# Patient Record
Sex: FEMALE | Race: OTHER | HISPANIC OR LATINO | URBAN - METROPOLITAN AREA
[De-identification: names, ages, dates, MRNs, and addresses within clinical notes are randomized per-mention and may not be internally consistent; named-entity substitution may affect disease eponyms.]

---

## 2018-05-25 ENCOUNTER — OUTPATIENT (OUTPATIENT)
Dept: OUTPATIENT SERVICES | Facility: HOSPITAL | Age: 38
LOS: 1 days | End: 2018-05-25
Payer: COMMERCIAL

## 2018-05-25 VITALS
TEMPERATURE: 98 F | DIASTOLIC BLOOD PRESSURE: 83 MMHG | WEIGHT: 179.9 LBS | RESPIRATION RATE: 18 BRPM | OXYGEN SATURATION: 100 % | SYSTOLIC BLOOD PRESSURE: 125 MMHG | HEIGHT: 63 IN | HEART RATE: 62 BPM

## 2018-05-25 DIAGNOSIS — S92.214D: ICD-10-CM

## 2018-05-25 DIAGNOSIS — Z01.818 ENCOUNTER FOR OTHER PREPROCEDURAL EXAMINATION: ICD-10-CM

## 2018-05-25 LAB
ANION GAP SERPL CALC-SCNC: 6 MMOL/L — SIGNIFICANT CHANGE UP (ref 5–17)
APTT BLD: 30 SEC — SIGNIFICANT CHANGE UP (ref 27.5–37.4)
BUN SERPL-MCNC: 14 MG/DL — SIGNIFICANT CHANGE UP (ref 7–18)
CALCIUM SERPL-MCNC: 8.6 MG/DL — SIGNIFICANT CHANGE UP (ref 8.4–10.5)
CHLORIDE SERPL-SCNC: 106 MMOL/L — SIGNIFICANT CHANGE UP (ref 96–108)
CO2 SERPL-SCNC: 28 MMOL/L — SIGNIFICANT CHANGE UP (ref 22–31)
CREAT SERPL-MCNC: 0.81 MG/DL — SIGNIFICANT CHANGE UP (ref 0.5–1.3)
GLUCOSE SERPL-MCNC: 92 MG/DL — SIGNIFICANT CHANGE UP (ref 70–99)
HCG SERPL-ACNC: <1 MIU/ML — SIGNIFICANT CHANGE UP
HCT VFR BLD CALC: 42 % — SIGNIFICANT CHANGE UP (ref 34.5–45)
HGB BLD-MCNC: 13.9 G/DL — SIGNIFICANT CHANGE UP (ref 11.5–15.5)
INR BLD: 1 RATIO — SIGNIFICANT CHANGE UP (ref 0.88–1.16)
MCHC RBC-ENTMCNC: 29.7 PG — SIGNIFICANT CHANGE UP (ref 27–34)
MCHC RBC-ENTMCNC: 33.1 GM/DL — SIGNIFICANT CHANGE UP (ref 32–36)
MCV RBC AUTO: 89.7 FL — SIGNIFICANT CHANGE UP (ref 80–100)
PLATELET # BLD AUTO: 222 K/UL — SIGNIFICANT CHANGE UP (ref 150–400)
POTASSIUM SERPL-MCNC: 4.4 MMOL/L — SIGNIFICANT CHANGE UP (ref 3.5–5.3)
POTASSIUM SERPL-SCNC: 4.4 MMOL/L — SIGNIFICANT CHANGE UP (ref 3.5–5.3)
PROTHROM AB SERPL-ACNC: 10.9 SEC — SIGNIFICANT CHANGE UP (ref 9.8–12.7)
RBC # BLD: 4.68 M/UL — SIGNIFICANT CHANGE UP (ref 3.8–5.2)
RBC # FLD: 11.9 % — SIGNIFICANT CHANGE UP (ref 10.3–14.5)
SODIUM SERPL-SCNC: 140 MMOL/L — SIGNIFICANT CHANGE UP (ref 135–145)
WBC # BLD: 8.2 K/UL — SIGNIFICANT CHANGE UP (ref 3.8–10.5)
WBC # FLD AUTO: 8.2 K/UL — SIGNIFICANT CHANGE UP (ref 3.8–10.5)

## 2018-05-25 PROCEDURE — 85730 THROMBOPLASTIN TIME PARTIAL: CPT

## 2018-05-25 PROCEDURE — 84702 CHORIONIC GONADOTROPIN TEST: CPT

## 2018-05-25 PROCEDURE — 80048 BASIC METABOLIC PNL TOTAL CA: CPT

## 2018-05-25 PROCEDURE — G0463: CPT

## 2018-05-25 PROCEDURE — 85610 PROTHROMBIN TIME: CPT

## 2018-05-25 PROCEDURE — 85027 COMPLETE CBC AUTOMATED: CPT

## 2018-05-25 NOTE — H&P PST ADULT - RS GEN PE MLT RESP DETAILS PC
respirations non-labored/good air movement/breath sounds equal/no subcutaneous emphysema/airway patent/no rales/no intercostal retractions/no wheezes/normal/no rhonchi/clear to auscultation bilaterally/no chest wall tenderness

## 2018-05-25 NOTE — H&P PST ADULT - ASSESSMENT
37 yr old female presents with c/o left foot pain due to fracture sustained after falling down 37 yr old female with PMH of Asthma ,GERD, migraine headache bilateral ovarian cyst, seasonal allergies presents with left foot fracture. Pt for open treatment of left foot cuboid fracture on 6/4/2018. Pt is at low risk for procedure.

## 2018-05-25 NOTE — H&P PST ADULT - PMH
Asthma    Cuboid fracture  left foot  GERD (gastroesophageal reflux disease)    Migraine headache    Ovarian cyst, bilateral    Preeclampsia    Seasonal allergies

## 2018-05-25 NOTE — H&P PST ADULT - HISTORY OF PRESENT ILLNESS
37 yr old female presents with c/o left foot pain due to fracture sustained after falling down 37 yr old female with PMH of Asthma ,GERD, migraine headache bilateral ovarian cyst, seasonal allergies presents with c/o left foot pain due to fracture sustained after falling down. Pt for open treatment of left foot cuboid fracture on 6/4/2018. 37 yr old female with PMH of mild intermittent asthma ,GERD, IBS-diarrhea, migraine headache bilateral ovarian cyst, seasonal allergies presents with c/o left foot pain due to fracture sustained after falling down. Pt for open treatment of left foot cuboid fracture on 6/4/2018.

## 2018-05-25 NOTE — H&P PST ADULT - RESPIRATORY AND THORAX COMMENTS
seasonal allergies, asthma seasonal allergies, asthma-last attack 5-6 years ago seasonal allergies, mild intermittent asthma-last attack 5-6 years ago

## 2018-05-25 NOTE — H&P PST ADULT - NSANTHOSAYNRD_GEN_A_CORE
No. TIFF screening performed.  STOP BANG Legend: 0-2 = LOW Risk; 3-4 = INTERMEDIATE Risk; 5-8 = HIGH Risk

## 2018-05-25 NOTE — H&P PST ADULT - NEGATIVE CARDIOVASCULAR SYMPTOMS
no chest pain/no palpitations/no claudication/no peripheral edema/no orthopnea/no paroxysmal nocturnal dyspnea/no dyspnea on exertion

## 2018-05-25 NOTE — H&P PST ADULT - FAMILY HISTORY
Mother  Still living? Yes, Estimated age: Age Unknown  Family history of hypertension in mother, Age at diagnosis: Age Unknown  Family history of heart disease, Age at diagnosis: Age Unknown  Family history of hyperlipidemia, Age at diagnosis: Age Unknown  Family history of thyroid disease in mother, Age at diagnosis: Age Unknown

## 2018-05-25 NOTE — H&P PST ADULT - PSH
H/O  section    H/O excision of ganglion cyst  right wrist  H/O tubal ligation    History of carpal tunnel surgery of right wrist

## 2018-05-30 DIAGNOSIS — Z98.51 TUBAL LIGATION STATUS: Chronic | ICD-10-CM

## 2018-05-30 DIAGNOSIS — J45.909 UNSPECIFIED ASTHMA, UNCOMPLICATED: ICD-10-CM

## 2018-05-30 DIAGNOSIS — Z98.890 OTHER SPECIFIED POSTPROCEDURAL STATES: Chronic | ICD-10-CM

## 2018-05-30 DIAGNOSIS — Z98.891 HISTORY OF UTERINE SCAR FROM PREVIOUS SURGERY: Chronic | ICD-10-CM

## 2018-05-30 DIAGNOSIS — S92.213A DISPLACED FRACTURE OF CUBOID BONE OF UNSPECIFIED FOOT, INITIAL ENCOUNTER FOR CLOSED FRACTURE: ICD-10-CM

## 2018-05-30 RX ORDER — SODIUM CHLORIDE 9 MG/ML
3 INJECTION INTRAMUSCULAR; INTRAVENOUS; SUBCUTANEOUS EVERY 8 HOURS
Qty: 0 | Refills: 0 | Status: DISCONTINUED | OUTPATIENT
Start: 2018-06-04 | End: 2018-06-12

## 2018-06-03 ENCOUNTER — TRANSCRIPTION ENCOUNTER (OUTPATIENT)
Age: 38
End: 2018-06-03

## 2018-06-04 ENCOUNTER — OUTPATIENT (OUTPATIENT)
Dept: OUTPATIENT SERVICES | Facility: HOSPITAL | Age: 38
LOS: 1 days | End: 2018-06-04
Payer: COMMERCIAL

## 2018-06-04 VITALS
RESPIRATION RATE: 16 BRPM | DIASTOLIC BLOOD PRESSURE: 83 MMHG | HEART RATE: 66 BPM | TEMPERATURE: 98 F | SYSTOLIC BLOOD PRESSURE: 119 MMHG | OXYGEN SATURATION: 100 %

## 2018-06-04 VITALS
SYSTOLIC BLOOD PRESSURE: 129 MMHG | WEIGHT: 179.9 LBS | HEIGHT: 63 IN | RESPIRATION RATE: 17 BRPM | HEART RATE: 79 BPM | DIASTOLIC BLOOD PRESSURE: 76 MMHG | OXYGEN SATURATION: 100 % | TEMPERATURE: 98 F

## 2018-06-04 DIAGNOSIS — S92.214A NONDISPLACED FRACTURE OF CUBOID BONE OF RIGHT FOOT, INITIAL ENCOUNTER FOR CLOSED FRACTURE: ICD-10-CM

## 2018-06-04 DIAGNOSIS — Z98.890 OTHER SPECIFIED POSTPROCEDURAL STATES: Chronic | ICD-10-CM

## 2018-06-04 DIAGNOSIS — Z98.51 TUBAL LIGATION STATUS: Chronic | ICD-10-CM

## 2018-06-04 DIAGNOSIS — Z98.891 HISTORY OF UTERINE SCAR FROM PREVIOUS SURGERY: Chronic | ICD-10-CM

## 2018-06-04 LAB — HCG UR QL: NEGATIVE — SIGNIFICANT CHANGE UP

## 2018-06-04 RX ORDER — ERGOCALCIFEROL 1.25 MG/1
1 CAPSULE ORAL
Qty: 0 | Refills: 0 | COMMUNITY

## 2018-06-04 RX ORDER — ALBUTEROL 90 UG/1
2 AEROSOL, METERED ORAL
Qty: 0 | Refills: 0 | COMMUNITY

## 2018-06-04 RX ORDER — IBUPROFEN 200 MG
1 TABLET ORAL
Qty: 0 | Refills: 0 | COMMUNITY

## 2018-06-04 RX ORDER — ONDANSETRON 8 MG/1
4 TABLET, FILM COATED ORAL ONCE
Qty: 0 | Refills: 0 | Status: COMPLETED | OUTPATIENT
Start: 2018-06-04 | End: 2018-06-04

## 2018-06-04 RX ORDER — SODIUM CHLORIDE 9 MG/ML
1000 INJECTION, SOLUTION INTRAVENOUS
Qty: 0 | Refills: 0 | Status: DISCONTINUED | OUTPATIENT
Start: 2018-06-04 | End: 2018-06-04

## 2018-06-04 RX ORDER — OXYCODONE HYDROCHLORIDE 5 MG/1
5 TABLET ORAL ONCE
Qty: 0 | Refills: 0 | Status: DISCONTINUED | OUTPATIENT
Start: 2018-06-04 | End: 2018-06-04

## 2018-06-04 RX ORDER — HYDROMORPHONE HYDROCHLORIDE 2 MG/ML
0.5 INJECTION INTRAMUSCULAR; INTRAVENOUS; SUBCUTANEOUS
Qty: 0 | Refills: 0 | Status: DISCONTINUED | OUTPATIENT
Start: 2018-06-04 | End: 2018-06-04

## 2018-06-04 RX ADMIN — ONDANSETRON 4 MILLIGRAM(S): 8 TABLET, FILM COATED ORAL at 14:39

## 2018-06-04 RX ADMIN — OXYCODONE HYDROCHLORIDE 5 MILLIGRAM(S): 5 TABLET ORAL at 15:26

## 2018-06-04 RX ADMIN — SODIUM CHLORIDE 3 MILLILITER(S): 9 INJECTION INTRAMUSCULAR; INTRAVENOUS; SUBCUTANEOUS at 14:40

## 2018-06-04 RX ADMIN — HYDROMORPHONE HYDROCHLORIDE 0.5 MILLIGRAM(S): 2 INJECTION INTRAMUSCULAR; INTRAVENOUS; SUBCUTANEOUS at 14:19

## 2018-06-04 RX ADMIN — HYDROMORPHONE HYDROCHLORIDE 0.5 MILLIGRAM(S): 2 INJECTION INTRAMUSCULAR; INTRAVENOUS; SUBCUTANEOUS at 14:29

## 2018-06-04 RX ADMIN — OXYCODONE HYDROCHLORIDE 5 MILLIGRAM(S): 5 TABLET ORAL at 15:56

## 2018-06-04 NOTE — ASU DISCHARGE PLAN (ADULT/PEDIATRIC). - NOTIFY
Swelling that continues/Bleeding that does not stop/Fever greater than 101/Numbness, color, or temperature change to extremity

## 2018-06-04 NOTE — BRIEF OPERATIVE NOTE - PROCEDURE
<<-----Click on this checkbox to enter Procedure ORIF fracture of tarsal bone  06/04/2018    Active  EDANNE

## 2018-06-04 NOTE — ASU DISCHARGE PLAN (ADULT/PEDIATRIC). - MEDICATION SUMMARY - MEDICATIONS TO TAKE
I will START or STAY ON the medications listed below when I get home from the hospital:    Motrin 400 mg oral tablet  -- 1 tab(s) by mouth every 6 hours, As Needed  -- Indication: For Pain    albuterol 90 mcg/inh inhalation aerosol  -- 2 puff(s) inhaled 4 times a day, As Needed  -- Indication: For Asthma    Vitamin D2 50,000 intl units (1.25 mg) oral capsule  -- 1 cap(s) by mouth once a week  -- Indication: For Prophylactic measure

## 2018-06-05 ENCOUNTER — RESULT REVIEW (OUTPATIENT)
Age: 38
End: 2018-06-05

## 2018-06-05 PROCEDURE — 28465 OPTX TARSAL BONE FX EACH: CPT | Mod: LT

## 2018-06-05 PROCEDURE — 81025 URINE PREGNANCY TEST: CPT

## 2018-06-05 PROCEDURE — C1713: CPT

## 2018-06-05 PROCEDURE — 88302 TISSUE EXAM BY PATHOLOGIST: CPT | Mod: 26

## 2018-06-05 PROCEDURE — C1889: CPT

## 2018-06-05 PROCEDURE — 88302 TISSUE EXAM BY PATHOLOGIST: CPT

## 2018-06-07 LAB — SURGICAL PATHOLOGY FINAL REPORT - CH: SIGNIFICANT CHANGE UP

## 2019-08-03 ENCOUNTER — EMERGENCY (EMERGENCY)
Facility: HOSPITAL | Age: 39
LOS: 0 days | Discharge: HOME | End: 2019-08-04
Attending: EMERGENCY MEDICINE
Payer: COMMERCIAL

## 2019-08-03 VITALS
WEIGHT: 175.05 LBS | SYSTOLIC BLOOD PRESSURE: 133 MMHG | DIASTOLIC BLOOD PRESSURE: 84 MMHG | RESPIRATION RATE: 18 BRPM | TEMPERATURE: 98 F | OXYGEN SATURATION: 99 % | HEIGHT: 63 IN | HEART RATE: 78 BPM

## 2019-08-03 VITALS
TEMPERATURE: 98 F | RESPIRATION RATE: 18 BRPM | OXYGEN SATURATION: 98 % | SYSTOLIC BLOOD PRESSURE: 126 MMHG | DIASTOLIC BLOOD PRESSURE: 72 MMHG | HEART RATE: 72 BPM

## 2019-08-03 DIAGNOSIS — Z88.0 ALLERGY STATUS TO PENICILLIN: ICD-10-CM

## 2019-08-03 DIAGNOSIS — Z98.891 HISTORY OF UTERINE SCAR FROM PREVIOUS SURGERY: Chronic | ICD-10-CM

## 2019-08-03 DIAGNOSIS — J45.909 UNSPECIFIED ASTHMA, UNCOMPLICATED: ICD-10-CM

## 2019-08-03 DIAGNOSIS — Z98.890 OTHER SPECIFIED POSTPROCEDURAL STATES: Chronic | ICD-10-CM

## 2019-08-03 DIAGNOSIS — R10.32 LEFT LOWER QUADRANT PAIN: ICD-10-CM

## 2019-08-03 DIAGNOSIS — D25.9 LEIOMYOMA OF UTERUS, UNSPECIFIED: ICD-10-CM

## 2019-08-03 DIAGNOSIS — N73.9 FEMALE PELVIC INFLAMMATORY DISEASE, UNSPECIFIED: ICD-10-CM

## 2019-08-03 DIAGNOSIS — R10.2 PELVIC AND PERINEAL PAIN: ICD-10-CM

## 2019-08-03 DIAGNOSIS — Z98.51 TUBAL LIGATION STATUS: Chronic | ICD-10-CM

## 2019-08-03 LAB
ALBUMIN SERPL ELPH-MCNC: 3.7 G/DL — SIGNIFICANT CHANGE UP (ref 3.3–5)
ALP SERPL-CCNC: 58 U/L — SIGNIFICANT CHANGE UP (ref 40–120)
ALT FLD-CCNC: 21 U/L — SIGNIFICANT CHANGE UP (ref 12–78)
ANION GAP SERPL CALC-SCNC: 7 MMOL/L — SIGNIFICANT CHANGE UP (ref 5–17)
APPEARANCE UR: CLEAR — SIGNIFICANT CHANGE UP
APTT BLD: 29 SEC — SIGNIFICANT CHANGE UP (ref 27.5–36.3)
AST SERPL-CCNC: 18 U/L — SIGNIFICANT CHANGE UP (ref 15–37)
BASOPHILS # BLD AUTO: 0.06 K/UL — SIGNIFICANT CHANGE UP (ref 0–0.2)
BASOPHILS NFR BLD AUTO: 0.6 % — SIGNIFICANT CHANGE UP (ref 0–2)
BILIRUB SERPL-MCNC: 0.2 MG/DL — SIGNIFICANT CHANGE UP (ref 0.2–1.2)
BILIRUB UR-MCNC: NEGATIVE — SIGNIFICANT CHANGE UP
BLD GP AB SCN SERPL QL: SIGNIFICANT CHANGE UP
BUN SERPL-MCNC: 16 MG/DL — SIGNIFICANT CHANGE UP (ref 7–23)
CALCIUM SERPL-MCNC: 8.9 MG/DL — SIGNIFICANT CHANGE UP (ref 8.5–10.1)
CHLORIDE SERPL-SCNC: 110 MMOL/L — HIGH (ref 96–108)
CO2 SERPL-SCNC: 25 MMOL/L — SIGNIFICANT CHANGE UP (ref 22–31)
COLOR SPEC: YELLOW — SIGNIFICANT CHANGE UP
CREAT SERPL-MCNC: 0.83 MG/DL — SIGNIFICANT CHANGE UP (ref 0.5–1.3)
DIFF PNL FLD: NEGATIVE — SIGNIFICANT CHANGE UP
EOSINOPHIL # BLD AUTO: 0.44 K/UL — SIGNIFICANT CHANGE UP (ref 0–0.5)
EOSINOPHIL NFR BLD AUTO: 4.1 % — SIGNIFICANT CHANGE UP (ref 0–6)
GLUCOSE SERPL-MCNC: 89 MG/DL — SIGNIFICANT CHANGE UP (ref 70–99)
GLUCOSE UR QL: NEGATIVE MG/DL — SIGNIFICANT CHANGE UP
HCT VFR BLD CALC: 39.8 % — SIGNIFICANT CHANGE UP (ref 34.5–45)
HGB BLD-MCNC: 12.9 G/DL — SIGNIFICANT CHANGE UP (ref 11.5–15.5)
IMM GRANULOCYTES NFR BLD AUTO: 0.4 % — SIGNIFICANT CHANGE UP (ref 0–1.5)
INR BLD: 0.91 RATIO — SIGNIFICANT CHANGE UP (ref 0.88–1.16)
KETONES UR-MCNC: NEGATIVE — SIGNIFICANT CHANGE UP
LACTATE SERPL-SCNC: 1.3 MMOL/L — SIGNIFICANT CHANGE UP (ref 0.7–2)
LEUKOCYTE ESTERASE UR-ACNC: NEGATIVE — SIGNIFICANT CHANGE UP
LIDOCAIN IGE QN: 152 U/L — SIGNIFICANT CHANGE UP (ref 73–393)
LYMPHOCYTES # BLD AUTO: 4.46 K/UL — HIGH (ref 1–3.3)
LYMPHOCYTES # BLD AUTO: 41.1 % — SIGNIFICANT CHANGE UP (ref 13–44)
MCHC RBC-ENTMCNC: 28.5 PG — SIGNIFICANT CHANGE UP (ref 27–34)
MCHC RBC-ENTMCNC: 32.4 GM/DL — SIGNIFICANT CHANGE UP (ref 32–36)
MCV RBC AUTO: 88.1 FL — SIGNIFICANT CHANGE UP (ref 80–100)
MONOCYTES # BLD AUTO: 0.77 K/UL — SIGNIFICANT CHANGE UP (ref 0–0.9)
MONOCYTES NFR BLD AUTO: 7.1 % — SIGNIFICANT CHANGE UP (ref 2–14)
NEUTROPHILS # BLD AUTO: 5.07 K/UL — SIGNIFICANT CHANGE UP (ref 1.8–7.4)
NEUTROPHILS NFR BLD AUTO: 46.7 % — SIGNIFICANT CHANGE UP (ref 43–77)
NITRITE UR-MCNC: NEGATIVE — SIGNIFICANT CHANGE UP
NRBC # BLD: 0 /100 WBCS — SIGNIFICANT CHANGE UP (ref 0–0)
PH UR: 5 — SIGNIFICANT CHANGE UP (ref 5–8)
PLATELET # BLD AUTO: 220 K/UL — SIGNIFICANT CHANGE UP (ref 150–400)
POTASSIUM SERPL-MCNC: 3.9 MMOL/L — SIGNIFICANT CHANGE UP (ref 3.5–5.3)
POTASSIUM SERPL-SCNC: 3.9 MMOL/L — SIGNIFICANT CHANGE UP (ref 3.5–5.3)
PROT SERPL-MCNC: 7.3 GM/DL — SIGNIFICANT CHANGE UP (ref 6–8.3)
PROT UR-MCNC: NEGATIVE MG/DL — SIGNIFICANT CHANGE UP
PROTHROM AB SERPL-ACNC: 10.2 SEC — SIGNIFICANT CHANGE UP (ref 10–12.9)
RBC # BLD: 4.52 M/UL — SIGNIFICANT CHANGE UP (ref 3.8–5.2)
RBC # FLD: 13.1 % — SIGNIFICANT CHANGE UP (ref 10.3–14.5)
SODIUM SERPL-SCNC: 142 MMOL/L — SIGNIFICANT CHANGE UP (ref 135–145)
SP GR SPEC: 1.01 — SIGNIFICANT CHANGE UP (ref 1.01–1.02)
UROBILINOGEN FLD QL: NEGATIVE MG/DL — SIGNIFICANT CHANGE UP
WBC # BLD: 10.84 K/UL — HIGH (ref 3.8–10.5)
WBC # FLD AUTO: 10.84 K/UL — HIGH (ref 3.8–10.5)

## 2019-08-03 PROCEDURE — 74177 CT ABD & PELVIS W/CONTRAST: CPT | Mod: 26

## 2019-08-03 PROCEDURE — 76856 US EXAM PELVIC COMPLETE: CPT | Mod: 26

## 2019-08-03 PROCEDURE — 99285 EMERGENCY DEPT VISIT HI MDM: CPT

## 2019-08-03 RX ORDER — MORPHINE SULFATE 50 MG/1
4 CAPSULE, EXTENDED RELEASE ORAL ONCE
Refills: 0 | Status: DISCONTINUED | OUTPATIENT
Start: 2019-08-03 | End: 2019-08-03

## 2019-08-03 RX ORDER — ONDANSETRON 8 MG/1
4 TABLET, FILM COATED ORAL ONCE
Refills: 0 | Status: COMPLETED | OUTPATIENT
Start: 2019-08-03 | End: 2019-08-03

## 2019-08-03 RX ORDER — SODIUM CHLORIDE 9 MG/ML
1000 INJECTION INTRAMUSCULAR; INTRAVENOUS; SUBCUTANEOUS ONCE
Refills: 0 | Status: COMPLETED | OUTPATIENT
Start: 2019-08-03 | End: 2019-08-03

## 2019-08-03 RX ORDER — AZITHROMYCIN 500 MG/1
1000 TABLET, FILM COATED ORAL ONCE
Refills: 0 | Status: COMPLETED | OUTPATIENT
Start: 2019-08-03 | End: 2019-08-03

## 2019-08-03 RX ADMIN — ONDANSETRON 4 MILLIGRAM(S): 8 TABLET, FILM COATED ORAL at 20:44

## 2019-08-03 RX ADMIN — MORPHINE SULFATE 4 MILLIGRAM(S): 50 CAPSULE, EXTENDED RELEASE ORAL at 20:44

## 2019-08-03 RX ADMIN — SODIUM CHLORIDE 1000 MILLILITER(S): 9 INJECTION INTRAMUSCULAR; INTRAVENOUS; SUBCUTANEOUS at 20:44

## 2019-08-03 NOTE — ED PROVIDER NOTE - OBJECTIVE STATEMENT
Pertinent PMH/PSH/FHx/SHx and Review of Systems contained within:    39yo F w PMH of asthma, uterine fibroids s/p hysterectomy 4mos ago, presents to ED for eval of LLQ pain & vaginal discharge.  Pt states she has noted mild LLQ pain for about 1wk, worse today.  Pt was in [facility] previously, had diarrhea, which resolved.  +nausea.  Denies fever, chills, CP, SOB, dysuria, hematuria. Pertinent PMH/PSH/FHx/SHx and Review of Systems contained within:    37yo F w PMH of asthma, uterine fibroids s/p hysterectomy 4mos ago, presents to ED for eval of LLQ pain & vaginal discharge.  Pt states she has noted mild LLQ pain for about 1wk, worse today.  Pt was in [facility] previously, had diarrhea, which resolved.  +nausea.  Denies fever, chills, CP, SOB, dysuria, hematuria.  Pt states she is sexually active, has low suspicion for STI but has been having vaginal bleeding w discharge.  +No abdominal pain, No neck/back pain, no rash, no changes in neurological status/function. Pertinent PMH/PSH/FHx/SHx and Review of Systems contained within:    39yo F w PMH of asthma, uterine fibroids s/p hysterectomy 4mos ago, presents to ED for eval of LLQ pain & vaginal discharge.  Pt states she has noted mild LLQ pain for about 1wk, worse today.  Pt was in [facility] previously, had diarrhea, which resolved.  +nausea.  Denies fever, chills, CP, SOB, dysuria, hematuria.  Pt states she is sexually active, has low suspicion for STI but has been having vaginal bleeding w discharge.    No fever/chills, No photophobia/eye pain/changes in vision, No ear pain/sore throat/dysphagia, No chest pain/palpitations, no SOB/cough/wheeze/stridor, +abdominal pain, No neck/back pain, no rash, no changes in neurological status/function.

## 2019-08-03 NOTE — ED ADULT TRIAGE NOTE - CHIEF COMPLAINT QUOTE
c/o l sided pelvic pain x 1 week s/p partial hysterectomy 4 months ago vaginal bleeding noted today" spotting" and vaginal d/c

## 2019-08-03 NOTE — ED ADULT NURSE NOTE - OBJECTIVE STATEMENT
pt. presents to the ED c/o left sided pelvic pain x 1 week s/p partial hysterectomy 4 months ago vaginal bleeding noted today" spotting" and vaginal d/c. pt sttaes " have a hard time urinating  and bladder hurts after that started today." pt states "feel nauseous and have a HA at this moment." pt denies vomiting,

## 2019-08-03 NOTE — ED PROVIDER NOTE - PHYSICAL EXAMINATION
Gen: Alert, c/o pain  Head: NC, AT, EOMI, normal lids/conjunctiva  ENT: normal hearing, patent oropharynx, MMM  Neck: supple, no tenderness/meningismus, FROM, Trachea midline  Pulm: Bilateral clear BS, normal resp effort, no wheeze/stridor/retractions  CV: RRR, no M/R/G, +dist pulses  Abd: soft, LUQ & LLQ TTP, ND, +BS, no guarding/rebound tenderness, no CVAT  Gyn: no blood in vaginal vault, no purulent vaginal discharge, +mucous at cervix, +CMT  Mskel: no edema/erythema/cyanosis  Skin: no rash  Neuro: no sensory/motor deficits

## 2019-08-03 NOTE — ED PROVIDER NOTE - CLINICAL SUMMARY MEDICAL DECISION MAKING FREE TEXT BOX
Pt w above dx, labs & imaging neg for acute pathology.  +bilateral ovarian cysts, no torsion.  Pt given Rx and instructed/cautioned on use. Discussed results and outcome of testing with the patient, given copy as well.  Patient advised to please follow up with their primary care doctor within the next 24 hours and return to the Emergency Department for worsening symptoms or any other concerns.  Patient advised that their doctor may call  to follow up on the specific results of the tests performed today in the emergency department.

## 2019-08-03 NOTE — ED ADULT NURSE NOTE - ED STAT RN HANDOFF DETAILS
Assuming patient's care for coverage. Report received from JUAN Nagy and patient informed during rounding. Assessment available on [facility]. Will continue to monitor

## 2019-08-04 PROBLEM — G43.909 MIGRAINE, UNSPECIFIED, NOT INTRACTABLE, WITHOUT STATUS MIGRAINOSUS: Chronic | Status: ACTIVE | Noted: 2018-05-30

## 2019-08-04 PROBLEM — J45.909 UNSPECIFIED ASTHMA, UNCOMPLICATED: Chronic | Status: ACTIVE | Noted: 2018-05-30

## 2019-08-04 PROBLEM — S92.213A: Chronic | Status: ACTIVE | Noted: 2018-05-30

## 2019-08-04 PROBLEM — K21.9 GASTRO-ESOPHAGEAL REFLUX DISEASE WITHOUT ESOPHAGITIS: Chronic | Status: ACTIVE | Noted: 2018-05-30

## 2019-08-04 PROBLEM — N83.201 UNSPECIFIED OVARIAN CYST, RIGHT SIDE: Chronic | Status: ACTIVE | Noted: 2018-05-30

## 2019-08-04 PROBLEM — J30.2 OTHER SEASONAL ALLERGIC RHINITIS: Chronic | Status: ACTIVE | Noted: 2018-05-30

## 2019-08-04 PROBLEM — O14.90 UNSPECIFIED PRE-ECLAMPSIA, UNSPECIFIED TRIMESTER: Chronic | Status: ACTIVE | Noted: 2018-05-30

## 2019-08-04 LAB
CULTURE RESULTS: SIGNIFICANT CHANGE UP
SPECIMEN SOURCE: SIGNIFICANT CHANGE UP

## 2019-08-04 RX ADMIN — Medication 100 MILLIGRAM(S): at 00:25

## 2019-08-04 RX ADMIN — AZITHROMYCIN 1000 MILLIGRAM(S): 500 TABLET, FILM COATED ORAL at 00:25

## 2019-08-05 LAB
C TRACH RRNA SPEC QL NAA+PROBE: SIGNIFICANT CHANGE UP
C TRACH RRNA SPEC QL NAA+PROBE: SIGNIFICANT CHANGE UP
N GONORRHOEA RRNA SPEC QL NAA+PROBE: SIGNIFICANT CHANGE UP
N GONORRHOEA RRNA SPEC QL NAA+PROBE: SIGNIFICANT CHANGE UP
SPECIMEN SOURCE: SIGNIFICANT CHANGE UP
SPECIMEN SOURCE: SIGNIFICANT CHANGE UP

## 2019-10-28 NOTE — ASU PREOP CHECKLIST - TEMPERATURE IN CELSIUS (DEGREES C)
36.9 Ear Star Wedge Flap Text: The defect edges were debeveled with a #15 blade scalpel.  Given the location of the defect and the proximity to free margins (helical rim) an ear star wedge flap was deemed most appropriate.  Using a sterile surgical marker, the appropriate flap was drawn incorporating the defect and placing the expected incisions between the helical rim and antihelix where possible.  The area thus outlined was incised through and through with a #15 scalpel blade.

## 2020-06-12 ENCOUNTER — TRANSCRIPTION ENCOUNTER (OUTPATIENT)
Age: 40
End: 2020-06-12

## 2021-06-08 NOTE — H&P PST ADULT - NEGATIVE GASTROINTESTINAL SYMPTOMS
no diarrhea/no constipation/no change in bowel habits/no abdominal pain/no flatulence/no vomiting/no nausea
Normal

## 2021-11-11 ENCOUNTER — APPOINTMENT (OUTPATIENT)
Dept: SURGERY | Facility: CLINIC | Age: 41
End: 2021-11-11
Payer: COMMERCIAL

## 2021-11-11 VITALS
HEART RATE: 74 BPM | DIASTOLIC BLOOD PRESSURE: 84 MMHG | BODY MASS INDEX: 34.04 KG/M2 | HEIGHT: 62 IN | WEIGHT: 185 LBS | TEMPERATURE: 97.6 F | OXYGEN SATURATION: 99 % | SYSTOLIC BLOOD PRESSURE: 137 MMHG

## 2021-11-11 DIAGNOSIS — Z78.9 OTHER SPECIFIED HEALTH STATUS: ICD-10-CM

## 2021-11-11 PROCEDURE — 99203 OFFICE O/P NEW LOW 30 MIN: CPT

## 2021-11-11 RX ORDER — CHOLECALCIFEROL (VITAMIN D3) 25 MCG
TABLET ORAL
Refills: 0 | Status: ACTIVE | COMMUNITY

## 2021-11-11 NOTE — HISTORY OF PRESENT ILLNESS
[de-identified] : Ms. HAWK MARCUS is a 41 year  old patient who was referred by Dr. Oscar Lobato with the chief complaint of having right upper quadrant and epigastric pain for 1 month. Pain is   radiating to the back.  She reports frequent episodes of   nausea and  vomiting. She reports  history of jaundice. she states that she was told that she had Hep A.     Appetite is good and weight is stable.   She   has no family history of biliary tract disease.   She had an abdominal sonogram on  10/29/2021 which did not show evidence of GB stones. CBD is normal.   She reports  , partial hysterectomy and abdominoplasty 5 months ago.  Patient was prescribed Augmentin by PMD. She is allergic to Penicillin. she was advised not to take it for now.

## 2021-11-11 NOTE — PHYSICAL EXAM
[Abdominal Masses] : No abdominal masses [Abdomen Tenderness] : ~T ~M Abdominal tenderness [Alert] : alert [Oriented to Person] : oriented to person [Oriented to Place] : oriented to place [Oriented to Time] : oriented to time [Calm] : calm [de-identified] : She  is alert, well-groomed, and in NAD\par   [de-identified] : anicteric.  Nasal mucosa pink, septum midline. Oral mucosa pink.  Tongue midline, Pharynx without exudates.\par   [de-identified] : Neck supple. Trachea midline. Thyroid isthmus barely palpable, lobes not felt.\par   [de-identified] : abdominoplasty scars

## 2021-11-11 NOTE — VITALS
[de-identified] : 8/10 [FreeTextEntry3] : right upper quadrant  [FreeTextEntry1] : nothing [FreeTextEntry2] : constant

## 2021-11-11 NOTE — PLAN
[FreeTextEntry1] : Ms. MARCUS  was told significance of findings, options, risks and benefits were explained.  .  All surgical options were discussed including non-surgical treatments.  She is with recent diagnosis of Hep A that could cause sensitivity and pain in her abdomen.   Her abdominal US is negative for GB stones. she was advised to have HIDA scan with EF and return after the test. \par Patient advised to seek immediate medical attention with any acute change in symptoms or with the development of any new or worsening symptoms.  Patient's questions and concerns addressed to patient's satisfaction, and patient verbalized an understanding of the information discussed.\par \par

## 2021-11-11 NOTE — CONSULT LETTER
[Dear  ___] : Dear  [unfilled], [Consult Letter:] : I had the pleasure of evaluating your patient, [unfilled]. [Please see my note below.] : Please see my note below. [Consult Closing:] : Thank you very much for allowing me to participate in the care of this patient.  If you have any questions, please do not hesitate to contact me. [Sincerely,] : Sincerely, [FreeTextEntry3] : Zeke Delgadillo MD, FACS

## 2021-11-12 ENCOUNTER — EMERGENCY (EMERGENCY)
Facility: HOSPITAL | Age: 41
LOS: 1 days | Discharge: HOME | End: 2021-11-12
Attending: STUDENT IN AN ORGANIZED HEALTH CARE EDUCATION/TRAINING PROGRAM | Admitting: STUDENT IN AN ORGANIZED HEALTH CARE EDUCATION/TRAINING PROGRAM
Payer: COMMERCIAL

## 2021-11-12 VITALS
DIASTOLIC BLOOD PRESSURE: 93 MMHG | SYSTOLIC BLOOD PRESSURE: 136 MMHG | RESPIRATION RATE: 16 BRPM | HEIGHT: 63 IN | HEART RATE: 76 BPM | OXYGEN SATURATION: 100 %

## 2021-11-12 DIAGNOSIS — Z98.51 TUBAL LIGATION STATUS: Chronic | ICD-10-CM

## 2021-11-12 DIAGNOSIS — Z98.890 OTHER SPECIFIED POSTPROCEDURAL STATES: Chronic | ICD-10-CM

## 2021-11-12 DIAGNOSIS — Z98.891 HISTORY OF UTERINE SCAR FROM PREVIOUS SURGERY: Chronic | ICD-10-CM

## 2021-11-12 LAB
ALBUMIN SERPL ELPH-MCNC: 4.3 G/DL — SIGNIFICANT CHANGE UP (ref 3.3–5)
ALP SERPL-CCNC: 56 U/L — SIGNIFICANT CHANGE UP (ref 40–120)
ALT FLD-CCNC: 24 U/L — SIGNIFICANT CHANGE UP (ref 4–33)
ANION GAP SERPL CALC-SCNC: 9 MMOL/L — SIGNIFICANT CHANGE UP (ref 7–14)
APPEARANCE UR: CLEAR — SIGNIFICANT CHANGE UP
AST SERPL-CCNC: 21 U/L — SIGNIFICANT CHANGE UP (ref 4–32)
BASOPHILS # BLD AUTO: 0.06 K/UL — SIGNIFICANT CHANGE UP (ref 0–0.2)
BASOPHILS NFR BLD AUTO: 0.6 % — SIGNIFICANT CHANGE UP (ref 0–2)
BILIRUB SERPL-MCNC: 0.3 MG/DL — SIGNIFICANT CHANGE UP (ref 0.2–1.2)
BILIRUB UR-MCNC: NEGATIVE — SIGNIFICANT CHANGE UP
BUN SERPL-MCNC: 10 MG/DL — SIGNIFICANT CHANGE UP (ref 7–23)
CALCIUM SERPL-MCNC: 9.3 MG/DL — SIGNIFICANT CHANGE UP (ref 8.4–10.5)
CHLORIDE SERPL-SCNC: 102 MMOL/L — SIGNIFICANT CHANGE UP (ref 98–107)
CO2 SERPL-SCNC: 26 MMOL/L — SIGNIFICANT CHANGE UP (ref 22–31)
COLOR SPEC: COLORLESS — SIGNIFICANT CHANGE UP
CREAT SERPL-MCNC: 0.75 MG/DL — SIGNIFICANT CHANGE UP (ref 0.5–1.3)
DIFF PNL FLD: NEGATIVE — SIGNIFICANT CHANGE UP
EOSINOPHIL # BLD AUTO: 0.59 K/UL — HIGH (ref 0–0.5)
EOSINOPHIL NFR BLD AUTO: 6.1 % — HIGH (ref 0–6)
GLUCOSE SERPL-MCNC: 98 MG/DL — SIGNIFICANT CHANGE UP (ref 70–99)
GLUCOSE UR QL: NEGATIVE — SIGNIFICANT CHANGE UP
HCG SERPL-ACNC: <5 MIU/ML — SIGNIFICANT CHANGE UP
HCT VFR BLD CALC: 40.3 % — SIGNIFICANT CHANGE UP (ref 34.5–45)
HGB BLD-MCNC: 12.8 G/DL — SIGNIFICANT CHANGE UP (ref 11.5–15.5)
IANC: 3.98 K/UL — SIGNIFICANT CHANGE UP (ref 1.5–8.5)
IMM GRANULOCYTES NFR BLD AUTO: 0.2 % — SIGNIFICANT CHANGE UP (ref 0–1.5)
KETONES UR-MCNC: NEGATIVE — SIGNIFICANT CHANGE UP
LEUKOCYTE ESTERASE UR-ACNC: NEGATIVE — SIGNIFICANT CHANGE UP
LIDOCAIN IGE QN: 39 U/L — SIGNIFICANT CHANGE UP (ref 7–60)
LYMPHOCYTES # BLD AUTO: 4.34 K/UL — HIGH (ref 1–3.3)
LYMPHOCYTES # BLD AUTO: 44.7 % — HIGH (ref 13–44)
MCHC RBC-ENTMCNC: 28.4 PG — SIGNIFICANT CHANGE UP (ref 27–34)
MCHC RBC-ENTMCNC: 31.8 GM/DL — LOW (ref 32–36)
MCV RBC AUTO: 89.4 FL — SIGNIFICANT CHANGE UP (ref 80–100)
MONOCYTES # BLD AUTO: 0.71 K/UL — SIGNIFICANT CHANGE UP (ref 0–0.9)
MONOCYTES NFR BLD AUTO: 7.3 % — SIGNIFICANT CHANGE UP (ref 2–14)
NEUTROPHILS # BLD AUTO: 3.98 K/UL — SIGNIFICANT CHANGE UP (ref 1.8–7.4)
NEUTROPHILS NFR BLD AUTO: 41.1 % — LOW (ref 43–77)
NITRITE UR-MCNC: NEGATIVE — SIGNIFICANT CHANGE UP
NRBC # BLD: 0 /100 WBCS — SIGNIFICANT CHANGE UP
NRBC # FLD: 0 K/UL — SIGNIFICANT CHANGE UP
PH UR: 6.5 — SIGNIFICANT CHANGE UP (ref 5–8)
PLATELET # BLD AUTO: 243 K/UL — SIGNIFICANT CHANGE UP (ref 150–400)
POTASSIUM SERPL-MCNC: 3.9 MMOL/L — SIGNIFICANT CHANGE UP (ref 3.5–5.3)
POTASSIUM SERPL-SCNC: 3.9 MMOL/L — SIGNIFICANT CHANGE UP (ref 3.5–5.3)
PROT SERPL-MCNC: 7.3 G/DL — SIGNIFICANT CHANGE UP (ref 6–8.3)
PROT UR-MCNC: NEGATIVE — SIGNIFICANT CHANGE UP
RBC # BLD: 4.51 M/UL — SIGNIFICANT CHANGE UP (ref 3.8–5.2)
RBC # FLD: 13.1 % — SIGNIFICANT CHANGE UP (ref 10.3–14.5)
SARS-COV-2 RNA SPEC QL NAA+PROBE: SIGNIFICANT CHANGE UP
SODIUM SERPL-SCNC: 137 MMOL/L — SIGNIFICANT CHANGE UP (ref 135–145)
SP GR SPEC: 1.01 — SIGNIFICANT CHANGE UP (ref 1–1.05)
UROBILINOGEN FLD QL: SIGNIFICANT CHANGE UP
WBC # BLD: 9.7 K/UL — SIGNIFICANT CHANGE UP (ref 3.8–10.5)
WBC # FLD AUTO: 9.7 K/UL — SIGNIFICANT CHANGE UP (ref 3.8–10.5)

## 2021-11-12 PROCEDURE — 76705 ECHO EXAM OF ABDOMEN: CPT | Mod: 26

## 2021-11-12 PROCEDURE — 74177 CT ABD & PELVIS W/CONTRAST: CPT | Mod: 26,MA

## 2021-11-12 PROCEDURE — 99285 EMERGENCY DEPT VISIT HI MDM: CPT

## 2021-11-12 RX ORDER — MORPHINE SULFATE 50 MG/1
4 CAPSULE, EXTENDED RELEASE ORAL ONCE
Refills: 0 | Status: DISCONTINUED | OUTPATIENT
Start: 2021-11-12 | End: 2021-11-12

## 2021-11-12 RX ORDER — ONDANSETRON 8 MG/1
4 TABLET, FILM COATED ORAL ONCE
Refills: 0 | Status: COMPLETED | OUTPATIENT
Start: 2021-11-12 | End: 2021-11-12

## 2021-11-12 RX ADMIN — MORPHINE SULFATE 4 MILLIGRAM(S): 50 CAPSULE, EXTENDED RELEASE ORAL at 17:59

## 2021-11-12 RX ADMIN — ONDANSETRON 4 MILLIGRAM(S): 8 TABLET, FILM COATED ORAL at 17:59

## 2021-11-12 NOTE — ED PROVIDER NOTE - CLINICAL SUMMARY MEDICAL DECISION MAKING FREE TEXT BOX
41F w recent dx of hepatitis p/w worsening RUQ and R flank pain, tender on exam, us and ct ordered, no other abdominal tenderness, no dysuira, no discharge, no vaginal bleeding, will check hepatitis panel, imaging, pain control

## 2021-11-12 NOTE — ED PROVIDER NOTE - NSICDXFAMILYHX_GEN_ALL_CORE_FT
FAMILY HISTORY:  Mother  Still living? Yes, Estimated age: Age Unknown  Family history of heart disease, Age at diagnosis: Age Unknown  Family history of hyperlipidemia, Age at diagnosis: Age Unknown  Family history of hypertension in mother, Age at diagnosis: Age Unknown  Family history of thyroid disease in mother, Age at diagnosis: Age Unknown

## 2021-11-12 NOTE — ED ADULT NURSE NOTE - SUICIDE SCREENING QUESTION 2
2018- To PACU from OR via gurney, sleeping, respirations spontaneous and non-labored via OPA. Abdominal lap sites x 3 with dermabond c/d/i. SCDs on. LR infusing via PIV.  2019- Rouses spontaneously; OPA dc'd. Drowsy.  2025- Family updated via telephone by EPI Martinez.  2033- O2 dc'd; on room air. Reports pain 2/10 and tolerable. No nausea reported at this time.  2048- Reports pain 2/10 and tolerable. No nausea reported. Family at bedside. Meets criteria for transfer to stage 2. Will continue to care for patient.  2105- Patient dressed and resting in recliner chair. PIV removed.  2115- DC/d to care of family.     No

## 2021-11-12 NOTE — ED PROVIDER NOTE - NS ED ROS FT
Constitutional:  (-) fever, (-) chills, (-) lethargy  Eyes:  (-) eye pain (-) visual changes  ENMT: (-) nasal discharge, (-) sore throat. (-) neck pain or stiffness  Cardiac: (-) chest pain (-) palpitations  Respiratory:  (-) cough (-) SOB  GI:  (+) nausea (+) vomiting (-) diarrhea (+) abdominal pain  :  (-) dysuria (-) frequency (-) burning  Neuro:  (-) headache (-) numbness (-) tingling (-) focal weakness  Skin:  (-) rash

## 2021-11-12 NOTE — ED ADULT TRIAGE NOTE - CHIEF COMPLAINT QUOTE
Will check A1c level   Pt c/o intermittent R flank pain radiating to RUQ area , N/V x 2 weeks.  Pt recently dx with Hep A

## 2021-11-12 NOTE — ED PROVIDER NOTE - PHYSICAL EXAMINATION
CONSTITUTIONAL: NAD, awake, alert  HEAD: Normocephalic; atraumatic  ENMT: External appears normal, MMM  CARD: Normal Sl, S2; no audible murmurs  RESP: normal wob, lungs ctab  ABD: soft, non-distended; RUQ TTP, R flank tenderness   MSK: no edema, normal ROM in all four extremities  SKIN: Warm, dry, no rashes  NEURO: aaox3, moving all extremities spontaneously

## 2021-11-12 NOTE — ED PROVIDER NOTE - PROGRESS NOTE DETAILS
Marcelo Corrigan DO - per my signout from Dr. Houston, if CT unremarkable plan on CDU for HIDA at request of outpt surgeon

## 2021-11-12 NOTE — ED PROVIDER NOTE - OBJECTIVE STATEMENT
41F p/w 5-6 weeks of RUQ pain that has been intermittent and constant x 5 days. States she initially had jaundice that has improved. States she was seen at urgent care and after following up with her pcp she was found to have hepatitis A. States she has had nausea and vomiting over the past few days. Denies dysuria, f/c, chest pain, sob.

## 2021-11-12 NOTE — ED PROVIDER NOTE - NSICDXPASTSURGICALHX_GEN_ALL_CORE_FT
PAST SURGICAL HISTORY:  H/O  section     H/O excision of ganglion cyst right wrist    H/O tubal ligation     History of carpal tunnel surgery of right wrist

## 2021-11-12 NOTE — ED PROVIDER NOTE - NSICDXPASTMEDICALHX_GEN_ALL_CORE_FT
PAST MEDICAL HISTORY:  Asthma     Cuboid fracture left foot    GERD (gastroesophageal reflux disease)     Migraine headache     Ovarian cyst, bilateral     Preeclampsia     Seasonal allergies

## 2021-11-12 NOTE — ED ADULT NURSE NOTE - OBJECTIVE STATEMENT
42 y/o F arrives to E.D. intake area c/o abd pain/vomiting x 2 wks. Pt has hx of partial hysterectomy. Pt recently dx with hepatitis A. Pt is a&ox4, ambulatory, neg sob, denies CP, endorses nausea and bilious vomiting, as well as alternating diarrhea and constipation. R 18g IV placed to AC. Medicated as per eMAR.

## 2021-11-13 VITALS — DIASTOLIC BLOOD PRESSURE: 75 MMHG | TEMPERATURE: 99 F | SYSTOLIC BLOOD PRESSURE: 126 MMHG | HEART RATE: 83 BPM

## 2021-11-13 LAB
CULTURE RESULTS: SIGNIFICANT CHANGE UP
HAV IGM SER-ACNC: SIGNIFICANT CHANGE UP
HBV CORE IGM SER-ACNC: SIGNIFICANT CHANGE UP
HBV SURFACE AG SER-ACNC: SIGNIFICANT CHANGE UP
HCV AB S/CO SERPL IA: 0.07 S/CO — SIGNIFICANT CHANGE UP (ref 0–0.99)
HCV AB SERPL-IMP: SIGNIFICANT CHANGE UP
SPECIMEN SOURCE: SIGNIFICANT CHANGE UP

## 2021-11-13 PROCEDURE — 99236 HOSP IP/OBS SAME DATE HI 85: CPT

## 2021-11-13 PROCEDURE — 78226 HEPATOBILIARY SYSTEM IMAGING: CPT | Mod: 26,GC

## 2021-11-13 RX ORDER — ONDANSETRON 8 MG/1
4 TABLET, FILM COATED ORAL ONCE
Refills: 0 | Status: COMPLETED | OUTPATIENT
Start: 2021-11-13 | End: 2021-11-13

## 2021-11-13 RX ORDER — METOCLOPRAMIDE HCL 10 MG
10 TABLET ORAL ONCE
Refills: 0 | Status: COMPLETED | OUTPATIENT
Start: 2021-11-13 | End: 2021-11-13

## 2021-11-13 RX ORDER — KETOROLAC TROMETHAMINE 30 MG/ML
15 SYRINGE (ML) INJECTION ONCE
Refills: 0 | Status: DISCONTINUED | OUTPATIENT
Start: 2021-11-13 | End: 2021-11-13

## 2021-11-13 RX ADMIN — ONDANSETRON 4 MILLIGRAM(S): 8 TABLET, FILM COATED ORAL at 09:17

## 2021-11-13 RX ADMIN — Medication 10 MILLIGRAM(S): at 06:19

## 2021-11-13 RX ADMIN — ONDANSETRON 4 MILLIGRAM(S): 8 TABLET, FILM COATED ORAL at 01:20

## 2021-11-13 RX ADMIN — Medication 15 MILLIGRAM(S): at 09:20

## 2021-11-13 RX ADMIN — Medication 15 MILLIGRAM(S): at 11:20

## 2021-11-13 NOTE — ED CDU PROVIDER DISPOSITION NOTE - NSFOLLOWUPINSTRUCTIONS_ED_ALL_ED_FT
Follow up with your Primary Medical Doctor in 1-2 days.  Follow up with your Surgeon in 1-2 days. (Bring a copy of your results)  Follow up with Gastroenterology in 1-2 days see attached list.  Rest.  Stay well hydrated.  Return to the ER for any persistent/worsening or new symptoms nausea, vomiting, fevers, chills, unable to eat/drink or any concerning symptoms.

## 2021-11-13 NOTE — ED CDU PROVIDER SUBSEQUENT DAY NOTE - PROGRESS NOTE DETAILS
FIORELLA Stewart: pt resting comfortably in bed NAD, pt denies any complaints at present time.  Pt awaiting HIDA Scan, will continue to monitor. FIORELLA Stewart: 42 y/o female with no significant PMHx presented to the ER c/o 5-6 weeks of RUQ pain worse the past few days.  Pt placed in CDU for HIDA scan.  HIDA scan negative.  Pt feels better tolerating PO.  Discharge and results reviewed with patient.

## 2021-11-13 NOTE — ED CDU PROVIDER DISPOSITION NOTE - PATIENT PORTAL LINK FT
You can access the FollowMyHealth Patient Portal offered by [facility] by registering at the following website: [email or web address]. By joining [facility]’s FollowMyHealth portal, you will also be able to view your health information using other applications (apps) compatible with our system.

## 2021-11-13 NOTE — ED ADULT NURSE REASSESSMENT NOTE - BREATH SOUNDS, RIGHT
Consult    Patient: Rafaela Burns MRN: [id number]  SSN: [id number]    YOB: 1969  Age: 53 y.o. Sex: male      Subjective:      Rafaela Burns is a 53 y.o. male who is being seen for flaccid left arm. Awoke yesterday w/ complete inability to use LUE. Says the upper arm feels warm and the lower arm feels cold. No trauma. Says he fell asleep in chair and awoke on floor. No idea how he changed positions. Says he was asleep for several hours. Came to ER several hours later. Denies pain. This has never happened to him before. History reviewed. No pertinent past medical history. History reviewed. No pertinent surgical history. History reviewed. No pertinent family history.   Social History     Tobacco Use    Smoking status: Current Every Day Smoker     Years: 20.00   Substance Use Topics    Alcohol use: Yes      Current Facility-Administered Medications   Medication Dose Route Frequency Provider Last Rate Last Dose    sodium chloride (NS) flush 5-40 mL  5-40 mL IntraVENous Q8H Matias Howard MD        sodium chloride (NS) flush 5-40 mL  5-40 mL IntraVENous PRN Leonardo Diallo MD        acetaminophen (TYLENOL) tablet 650 mg  650 mg Oral Q4H PRN Leonardo Diallo MD        oxyCODONE-acetaminophen (PERCOCET) 5-325 mg per tablet 1 Tab  1 Tab Oral Q4H PRN Leonardo Diallo MD        morphine injection 2 mg  2 mg IntraVENous Q2H PRN Leonardo Diallo MD        naloxone [facility]) injection 0.4 mg  0.4 mg IntraVENous PRN Leonardo Diallo MD        ondansetron [facility]) injection 4 mg  4 mg IntraVENous Q4H PRN Leonardo Diallo MD        aspirin delayed-release tablet 81 mg  81 mg Oral DAILY Leonardo Diallo MD        diphenhydrAMINE (BENADRYL) injection 12.5 mg  12.5 mg IntraVENous Q4H PRN Leonardo Diallo MD        docusate sodium (COLACE) capsule 100 mg  100 mg Oral BID Leonardo Diallo MD        heparin (porcine) injection 5,000 Units  5,000 Units SubCUTAneous Q8H Buck Feliz MD JAVED   5,000 Units at 11/06/19 0438    clopidogrel (PLAVIX) tablet 75 mg  75 mg Oral DAILY Rhett Mitchell MD        albuterol (PROVENTIL VENTOLIN) nebulizer solution 2.5 mg  2.5 mg Nebulization Q4H PRN Rhett Mitchell MD            Allergies   Allergen Reactions    Shellfish Derived Angioedema       Review of Systems:  A comprehensive review of systems was negative except for that written in the History of Present Illness. Objective:     Vitals:    11/06/19 0300 11/06/19 0400 11/06/19 0500 11/06/19 0600   BP: (!) 151/92 (!) 146/96 (!) 144/94 (!) 150/100   Pulse: (!) 108 93 89 94   Resp: 18 15 14 14   Temp: 99.7 °F (37.6 °C)      SpO2: 95% 99% 100% 100%   Weight:       Height:            Physical Exam:  GENERAL: alert, cooperative, no distress, appears stated age  EXTREMITIES:  LUE:  Mild edema, decreased sensation throughout all dermatomes in LUE. No motor activity in c5-t1. Compartments soft. +2 radial pulse and good cap refill. No masses or LAD. Shoulder/elbow/wrist/hand joints supple. Labs:  K 4.0, CK 5600 ->4973 (MB and MB index decreasing)  Assessment:   Flaccid paralysis LUE, unclear etiology. Suspect brachial plexus traction injury. Elevated CK indicative of muscle injury but levels are decreasing with hydration. Plan:     1. MRI brachial plexus. Would consider NCS/EMG depending on results. 2.  Will continue to follow. No acute orthopaedic surgical intervention warranted at this time.     Signed By: Oswaldo Mojica MD     November 6, 2019 clear

## 2021-11-13 NOTE — ED CDU PROVIDER SUBSEQUENT DAY NOTE - CROS ED GI ALL NEG
----- Message from Inessa Crystal MD sent at 11/2/2021 10:25 AM CDT -----  Please notify the patient of normal results.  Follow-up as planned.   - - -

## 2021-11-13 NOTE — ED CDU PROVIDER SUBSEQUENT DAY NOTE - ATTENDING CONTRIBUTION TO CARE
ATTENDING, MD Kyle: I have personally performed a face to face diagnostic evaluation on this patient.  I have reviewed the ACP note and agree with the history, exam, and plan of care, except as noted here. Progress notes and further evaluation to be reviewed by observation and discharging attending.    42 yo F, is here at CDU for HIDA scan and subsequent eval for abd pain, initial CT scan and u/s negative to the biliary etiology or other acute pathology, if HIDA scan negative, then d/c with GI and GYN follow up. pt is not in significant discomfort, will PO challenge if HIDA negative.

## 2021-11-13 NOTE — ED CDU PROVIDER INITIAL DAY NOTE - ATTENDING CONTRIBUTION TO CARE
41F w 5-6 weeks of RUQ, worse over the past few days. Earlier had jaundice that resolved. Dx'ed w/ hep A. Bili wnl, persistent RUQ, placed in CDU for HIDA, serial abdominal exams, reassessment

## 2021-11-13 NOTE — ED CDU PROVIDER DISPOSITION NOTE - CLINICAL COURSE
FIORELLA Stewart: 42 y/o female with no significant PMHx presented to the ER c/o 5-6 weeks of RUQ pain worse the past few days.  Pt placed in CDU for HIDA scan.  HIDA scan negative.  Pt feels better tolerating PO.  Discharge and results reviewed with patient.

## 2021-11-13 NOTE — ED CDU PROVIDER SUBSEQUENT DAY NOTE - HISTORY
CDU FIORELLA STEWART - Pt resting comfortably. No complaints. VSS. Will continue to observe and reassess in am

## 2021-11-13 NOTE — ED CDU PROVIDER INITIAL DAY NOTE - OBJECTIVE STATEMENT
41F p/w 5-6 weeks of RUQ pain that has been intermittent and constant x 5 days. States she initially had jaundice that has improved. States she was seen at urgent care and after following up with her pcp she was found to have hepatitis A. States she has had nausea and vomiting over the past few days. Denies dysuria, f/c, chest pain, sob.    CDU FIORELLA STEWART: Agree with above. Pt states she followed up with gen surgery yesterday and was recommended to have outpt HIDA. Pt notes she was told to come to ED if pain worsened which prompted todys visit. Pt was evaluated in ED, US and CT a/p unremarkable, labs unremarkable, HIDA offered in CDU, pt amenable to plan.

## 2021-11-13 NOTE — ED CDU PROVIDER DISPOSITION NOTE - ATTENDING CONTRIBUTION TO CARE
ATTENDING, MD Kyle: I have personally performed a face to face diagnostic evaluation on this patient.  I have reviewed the ACP note and agree with the history, exam, and plan of care, except as noted here. Progress notes and further evaluation to be reviewed by observation and discharging attending.

## 2021-11-17 ENCOUNTER — APPOINTMENT (OUTPATIENT)
Dept: GASTROENTEROLOGY | Facility: CLINIC | Age: 41
End: 2021-11-17
Payer: COMMERCIAL

## 2021-11-17 VITALS
OXYGEN SATURATION: 95 % | HEART RATE: 81 BPM | DIASTOLIC BLOOD PRESSURE: 90 MMHG | BODY MASS INDEX: 34.6 KG/M2 | SYSTOLIC BLOOD PRESSURE: 120 MMHG | WEIGHT: 188 LBS | TEMPERATURE: 97.3 F | HEIGHT: 62 IN

## 2021-11-17 DIAGNOSIS — Z20.822 CONTACT WITH AND (SUSPECTED) EXPOSURE TO COVID-19: ICD-10-CM

## 2021-11-17 DIAGNOSIS — R10.9 UNSPECIFIED ABDOMINAL PAIN: ICD-10-CM

## 2021-11-17 DIAGNOSIS — Z01.818 ENCOUNTER FOR OTHER PREPROCEDURAL EXAMINATION: ICD-10-CM

## 2021-11-17 DIAGNOSIS — K59.00 CONSTIPATION, UNSPECIFIED: ICD-10-CM

## 2021-11-17 DIAGNOSIS — Z87.11 PERSONAL HISTORY OF PEPTIC ULCER DISEASE: ICD-10-CM

## 2021-11-17 DIAGNOSIS — Z82.49 FAMILY HISTORY OF ISCHEMIC HEART DISEASE AND OTHER DISEASES OF THE CIRCULATORY SYSTEM: ICD-10-CM

## 2021-11-17 DIAGNOSIS — Z83.71 FAMILY HISTORY OF COLONIC POLYPS: ICD-10-CM

## 2021-11-17 PROCEDURE — 99204 OFFICE O/P NEW MOD 45 MIN: CPT

## 2021-11-17 RX ORDER — AMITRIPTYLINE HYDROCHLORIDE 10 MG/1
10 TABLET, FILM COATED ORAL
Qty: 30 | Refills: 5 | Status: ACTIVE | COMMUNITY
Start: 2021-11-17 | End: 1900-01-01

## 2021-11-17 RX ORDER — FEXOFENADINE HYDROCHLORIDE 180 MG/1
TABLET ORAL
Refills: 0 | Status: DISCONTINUED | COMMUNITY
End: 2021-11-17

## 2021-11-17 RX ORDER — OMEPRAZOLE 100 %
POWDER (GRAM) MISCELLANEOUS
Refills: 0 | Status: DISCONTINUED | COMMUNITY
End: 2021-11-17

## 2021-11-17 RX ORDER — AMOXICILLIN AND CLAVULANATE POTASSIUM 875; 125 MG/1; 1/1
875-125 TABLET, FILM COATED ORAL
Refills: 0 | Status: DISCONTINUED | COMMUNITY
End: 2021-11-17

## 2021-11-17 NOTE — REASON FOR VISIT
[Initial Evaluation] : an initial evaluation [FreeTextEntry1] : Right upper quadrant abdominal pain.  Recurrent vomiting.

## 2021-11-17 NOTE — REVIEW OF SYSTEMS
[Abdominal Pain] : abdominal pain [Vomiting] : vomiting [Constipation] : constipation [Heartburn] : heartburn [Melena] : no melena [Negative] : Heme/Lymph

## 2021-11-17 NOTE — HISTORY OF PRESENT ILLNESS
[Diarrhea] : stable diarrhea [Yellow Skin Or Eyes (Jaundice)] : denies jaundice [Abdominal Swelling] : denies abdominal swelling [Rectal Pain] : denies rectal pain [Heartburn] : heartburn [Nausea] : nausea [Vomiting] : vomiting [Constipation] : constipation [Abdominal Pain] : abdominal pain [GERD] : gastroesophageal reflux disease [Peptic Ulcer Disease] : peptic ulcer disease [Irritable Bowel Syndrome] : irritable bowel syndrome [Wt Gain ___ Lbs] : no recent weight gain [Wt Loss ___ Lbs] : no recent weight loss [Hiatus Hernia] : no hiatus hernia [Pancreatitis] : no pancreatitis [Cholelithiasis] : no cholelithiasis [Kidney Stone] : no kidney stone [Inflammatory Bowel Disease] : no inflammatory bowel disease [Diverticulitis] : no diverticulitis [Alcohol Abuse] : no alcohol abuse [Malignancy] : no malignancy [Abdominal Surgery] : no abdominal surgery [Appendectomy] : no appendectomy [Cholecystectomy] : no cholecystectomy [de-identified] : Patient has been having pain in the right upper quadrant of the abdomen for last 2 weeks pain is starting infrascapular area and radiate around the axillary area into right upper quadrant.  Pain is constant severe worse on deep breath.\par \par For same 2 weeks she has been having heartburn, recurrent vomiting mainly bilious vomitus.  Chronic nausea.  Patient gives history of ulcer disease about 8 years ago which was diagnosed on upper endoscopy and was also found to have H. pylori positive treated for it the over last 8 years she has no symptoms related to the upper GI tract till about 2 weeks ago.\par \par Patient also gives history of irritable bowel syndrome for last 8 to 10 years mainly presenting with the lower abdominal pain and diarrhea about once or twice a week lasting day or so 5-6 bowel movements in a day.  She had a colonoscopy 8 years ago evaluated which was normal and thus the time she was diagnosed with irritable bowel syndrome.  In the last 1 week her bowel habits change and for last 1 week she has not moved bowel at all have bloating and excessive gas.  She denies any history of hematemesis melena or hematochezia.  Due to the severe right upper quadrant pain she went to the hospital had blood work-up done which was all normal also had a sonogram of the abdomen done which revealed normal gallbladder without any stone, gallbladder was contracted liver was normal and there was no dilatation of bile duct.  Patient was seen by surgeon and being worked up for biliary dyskinesia.  In hospital she was diagnosed also with gastritis and was told to follow-up with a GI for an upper endoscopy to further evaluate it.\par \par In October 2021 she had jaundice with fatigue and was diagnosed with hepatitis A.  Jaundice has since resolved. [FreeTextEntry1] : Patient has history of irritable bowel syndrome, peptic ulcer disease.  Under a lot of stress.  No history of hypertension, diabetes mellitus, MI, angina, CHF, TIA, CVA.  History of colon polyps was advised to have follow-up colonoscopy within a year which she never did.  History of asthma patient is presently on Pepcid 40 mg a day and Zofran 4 mg every 8 hourly as needed for nausea and vomiting none of them is helping her she take inhaler as needed for asthma.

## 2021-11-17 NOTE — ASSESSMENT
[FreeTextEntry1] : Right upper quadrant pain appears to be muscular in origin.  Differential diagnosis will include irritable bowel syndrome.  Patient has constipation for last 1 week and has not moved bowel which may be causing bloating and excessive gas.  Persistent vomiting over 2 weeks check for gastritis or ulcer disease again the differential diagnosis will be functional dyspepsia with vomiting.  As patient not responding to the Pepcid 40 mg a day she will change to  Nexium 40 mg a day and for irritable bowel syndrome and functional dyspepsia vomiting which is not responding to Zofran started on amitriptyline 10 mg at bedtime.  Patient asked not to drive after it.  For the constipation she will take mineral oil 15 mm every day for 3 days and milk of magnesia 60 mL every day for 3 days.  For right upper quadrant pain which appears muscular in origin she will take ibuprofen 600 mg 3 times a day for 1 week and then stop it.  Due to the history of colon polyps and this persistent nausea and vomiting patient will need upper endoscopy and colonoscopy to evaluate and in new colon polyp and check for ulcer disease.  Indication risk-benefit of upper endoscopy and colonoscopy discussed with the patient informed consent obtained.  Bowel prep also explained to the patient.  Patient will follow up with me in 3 weeks to see if she responds to the omeprazole and amitriptyline.

## 2021-12-08 ENCOUNTER — APPOINTMENT (OUTPATIENT)
Dept: GASTROENTEROLOGY | Facility: CLINIC | Age: 41
End: 2021-12-08
Payer: COMMERCIAL

## 2021-12-08 VITALS
SYSTOLIC BLOOD PRESSURE: 130 MMHG | WEIGHT: 194 LBS | HEIGHT: 62 IN | DIASTOLIC BLOOD PRESSURE: 85 MMHG | BODY MASS INDEX: 35.7 KG/M2 | TEMPERATURE: 97.3 F

## 2021-12-08 DIAGNOSIS — K58.9 IRRITABLE BOWEL SYNDROME W/OUT DIARRHEA: ICD-10-CM

## 2021-12-08 DIAGNOSIS — Z86.010 PERSONAL HISTORY OF COLONIC POLYPS: ICD-10-CM

## 2021-12-08 DIAGNOSIS — K30 FUNCTIONAL DYSPEPSIA: ICD-10-CM

## 2021-12-08 PROCEDURE — 99213 OFFICE O/P EST LOW 20 MIN: CPT

## 2021-12-08 RX ORDER — ONDANSETRON 8 MG/1
8 TABLET ORAL
Refills: 0 | Status: DISCONTINUED | COMMUNITY
End: 2021-12-08

## 2021-12-08 RX ORDER — URSODIOL 500 MG/1
TABLET ORAL
Refills: 0 | Status: DISCONTINUED | COMMUNITY
End: 2021-12-08

## 2021-12-08 RX ORDER — FAMOTIDINE 40 MG/1
40 TABLET, FILM COATED ORAL
Refills: 0 | Status: DISCONTINUED | COMMUNITY

## 2021-12-08 NOTE — ASSESSMENT
[FreeTextEntry1] : Patient with irritable bowel syndrome and functional dyspepsia better with the Nexium 40 mg a day and amitriptyline 10 mg at bedtime add Citrucel one heaping tablespoonful in a glass of water every day for constipation alternating with diarrhea.  Patient is scheduled for upper endoscopy and colonoscopy on December 23, 2021

## 2021-12-08 NOTE — PHYSICAL EXAM
[General Appearance - Alert] : alert [General Appearance - In No Acute Distress] : in no acute distress [Sclera] : the sclera and conjunctiva were normal [PERRL With Normal Accommodation] : pupils were equal in size, round, and reactive to light [Extraocular Movements] : extraocular movements were intact [Outer Ear] : the ears and nose were normal in appearance [Oropharynx] : the oropharynx was normal [Neck Appearance] : the appearance of the neck was normal [Neck Cervical Mass (___cm)] : no neck mass was observed [Jugular Venous Distention Increased] : there was no jugular-venous distention [Thyroid Diffuse Enlargement] : the thyroid was not enlarged [Thyroid Nodule] : there were no palpable thyroid nodules [Auscultation Breath Sounds / Voice Sounds] : lungs were clear to auscultation bilaterally [Full Pulse] : the pedal pulses are present [Edema] : there was no peripheral edema [Bowel Sounds] : normal bowel sounds [Abdomen Soft] : soft [Abdomen Tenderness] : non-tender [Abdomen Mass (___ Cm)] : no abdominal mass palpated [Cervical Lymph Nodes Enlarged Posterior Bilaterally] : posterior cervical [Cervical Lymph Nodes Enlarged Anterior Bilaterally] : anterior cervical [Supraclavicular Lymph Nodes Enlarged Bilaterally] : supraclavicular [Axillary Lymph Nodes Enlarged Bilaterally] : axillary [Femoral Lymph Nodes Enlarged Bilaterally] : femoral [Inguinal Lymph Nodes Enlarged Bilaterally] : inguinal [No CVA Tenderness] : no ~M costovertebral angle tenderness [No Spinal Tenderness] : no spinal tenderness [Abnormal Walk] : normal gait [Nail Clubbing] : no clubbing  or cyanosis of the fingernails [Musculoskeletal - Swelling] : no joint swelling seen [Motor Tone] : muscle strength and tone were normal [Skin Color & Pigmentation] : normal skin color and pigmentation [Skin Turgor] : normal skin turgor [] : no rash [Deep Tendon Reflexes (DTR)] : deep tendon reflexes were 2+ and symmetric [Sensation] : the sensory exam was normal to light touch and pinprick [No Focal Deficits] : no focal deficits [Oriented To Time, Place, And Person] : oriented to person, place, and time [Impaired Insight] : insight and judgment were intact [Affect] : the affect was normal

## 2021-12-08 NOTE — HISTORY OF PRESENT ILLNESS
[Heartburn] : improved heartburn [Nausea] : improved nausea [Vomiting] : denies vomiting [Diarrhea] : improved diarrhea [Constipation] : improvement in constipation [Yellow Skin Or Eyes (Jaundice)] : denies jaundice [Abdominal Pain] : denies abdominal pain [Abdominal Swelling] : denies abdominal swelling [Rectal Pain] : denies rectal pain [GERD] : gastroesophageal reflux disease [Irritable Bowel Syndrome] : irritable bowel syndrome [Wt Gain ___ Lbs] : no recent weight gain [Wt Loss ___ Lbs] : no recent weight loss [Hiatus Hernia] : no hiatus hernia [Peptic Ulcer Disease] : no peptic ulcer disease [Pancreatitis] : no pancreatitis [Cholelithiasis] : no cholelithiasis [Inflammatory Bowel Disease] : no inflammatory bowel disease [Diverticulitis] : no diverticulitis [Alcohol Abuse] : no alcohol abuse [Abdominal Surgery] : no abdominal surgery [Appendectomy] : no appendectomy [Cholecystectomy] : no cholecystectomy [de-identified] : Patient symptoms are significantly improved on omeprazole 40 mg a day and amitriptyline 10 mg at bedtime.  She did not have any further right upper quadrant abdominal pain.  Still have altered bowel habits she will be constipated for 4 days and then alternate day 1 day of the diarrhea no blood in the stool.  No lower abdominal pain but does feel bloated up which is better after bowel movement. [FreeTextEntry1] : Patient has history of hepatitis C with jaundice recovered completely.  History of irritable bowel syndrome diagnosed about 10 years ago mixed variety constipation alternating with diarrhea chronic epigastric discomfort and heartburn has been not well controlled with the treatment she denies any history of hypertension, diabetes mellitus, MI, angina, CHF, TIA, CVA she does give history of colon polyps.  Patient is a scheduled for upper endoscopy and colonoscopy to evaluate her GI symptoms and surveillance of colon polyp on December 23, 2021.

## 2021-12-19 ENCOUNTER — APPOINTMENT (OUTPATIENT)
Dept: DISASTER EMERGENCY | Facility: CLINIC | Age: 41
End: 2021-12-19

## 2021-12-21 LAB — SARS-COV-2 N GENE NPH QL NAA+PROBE: NOT DETECTED

## 2021-12-23 ENCOUNTER — APPOINTMENT (OUTPATIENT)
Dept: GASTROENTEROLOGY | Facility: AMBULATORY MEDICAL SERVICES | Age: 41
End: 2021-12-23
Payer: COMMERCIAL

## 2021-12-23 PROCEDURE — 43239 EGD BIOPSY SINGLE/MULTIPLE: CPT

## 2021-12-23 PROCEDURE — 45378 DIAGNOSTIC COLONOSCOPY: CPT

## 2022-01-14 ENCOUNTER — RX RENEWAL (OUTPATIENT)
Age: 42
End: 2022-01-14

## 2022-01-14 RX ORDER — ESOMEPRAZOLE MAGNESIUM 40 MG/1
40 CAPSULE, DELAYED RELEASE ORAL DAILY
Qty: 30 | Refills: 1 | Status: ACTIVE | COMMUNITY
Start: 2021-11-17 | End: 1900-01-01

## 2023-04-30 ENCOUNTER — NON-APPOINTMENT (OUTPATIENT)
Age: 43
End: 2023-04-30

## 2023-05-02 ENCOUNTER — APPOINTMENT (OUTPATIENT)
Dept: ORTHOPEDIC SURGERY | Facility: CLINIC | Age: 43
End: 2023-05-02
Payer: COMMERCIAL

## 2023-05-02 VITALS — BODY MASS INDEX: 34.96 KG/M2 | WEIGHT: 190 LBS | HEIGHT: 62 IN

## 2023-05-02 DIAGNOSIS — J45.909 UNSPECIFIED ASTHMA, UNCOMPLICATED: ICD-10-CM

## 2023-05-02 PROCEDURE — L3908: CPT

## 2023-05-02 PROCEDURE — 99203 OFFICE O/P NEW LOW 30 MIN: CPT

## 2023-05-02 RX ORDER — METHYLPREDNISOLONE 4 MG/1
4 TABLET ORAL
Qty: 1 | Refills: 0 | Status: ACTIVE | COMMUNITY
Start: 2023-05-02 | End: 1900-01-01

## 2023-05-02 NOTE — ASSESSMENT
[FreeTextEntry1] : The patient was advised of the diagnosis. The natural history of the pathology was explained in full to the patient in layman's terms. All questions were answered. 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 We discussed that most of the TFCC is avascular and tears are slow to heal if at all but that surgical results are not guaranteed due to the poor healing capacity of the structure.  Even at surgery, most tears cannot be repaired, but are merely debrided.  We discussed the use of nsaids, bracing, rest, local modalities, injection and surgery in the treatment of TFCC tears. At this point, the patient will proceed with non-operative treatment.\par \par wrist brace\par steroid antiinflammatory meds

## 2023-05-02 NOTE — HISTORY OF PRESENT ILLNESS
[8] : 8 [Radiating] : radiating [] : yes [de-identified] : injured left wrist with fridge door- she was opening the fride door and the dog ran at her and the door slammed on her wrist\par advil(800mg) and tylenol not helping\par pain worsening and radiating up the arm\par left dominant, but writes right handed, office work- remotely\par \par h/o ulcer many years ago [FreeTextEntry7] : upper arm

## 2023-05-02 NOTE — REASON FOR VISIT
[FreeTextEntry2] : New pt visit for left wrist.  pt injured wrist and now has pain radiating to the upper arm.  pt has swollen hand.

## 2023-05-02 NOTE — IMAGING
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

## 2023-05-02 NOTE — DATA REVIEWED
[I independently reviewed and interpreted images and report] : I independently reviewed and interpreted images and report [FreeTextEntry1] : left wrist no fractures/dislocs

## 2023-05-16 ENCOUNTER — APPOINTMENT (OUTPATIENT)
Dept: ORTHOPEDIC SURGERY | Facility: CLINIC | Age: 43
End: 2023-05-16
Payer: COMMERCIAL

## 2023-05-16 VITALS — HEIGHT: 62 IN | WEIGHT: 190 LBS | BODY MASS INDEX: 34.96 KG/M2

## 2023-05-16 DIAGNOSIS — S69.82XA OTHER SPECIFIED INJURIES OF LEFT WRIST, HAND AND FINGER(S), INITIAL ENCOUNTER: ICD-10-CM

## 2023-05-16 PROCEDURE — 99213 OFFICE O/P EST LOW 20 MIN: CPT

## 2023-05-16 NOTE — HISTORY OF PRESENT ILLNESS
[6] : 6 [4] : 4 [Dull/Aching] : dull/aching [Intermittent] : intermittent [Leisure] : leisure [de-identified] : 5/16/23:  Pt still has been wearing brace and still has some pain with motion.\par \par 5/2/23:  injured left wrist with fridge door- she was opening the fride door and the dog ran at her and the door slammed on her wrist\par advil(800mg) and tylenol not helping\par pain worsening and radiating up the arm\par left dominant, but writes right handed, office work- remotely\par \par h/o ulcer many years ago [] : Post Surgical Visit: no [FreeTextEntry1] : Left Wrist [de-identified] : movement of the wrist [de-identified] : 05/02/23 [de-identified] : Dr. King [de-identified] : none

## 2023-05-16 NOTE — ASSESSMENT
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 We discussed that most of the TFCC is avascular and tears are slow to heal if at all but that surgical results are not guaranteed due to the poor healing capacity of the structure.  Even at surgery, most tears cannot be repaired, but are merely debrided.  We discussed the use of nsaids, bracing, rest, local modalities, injection and surgery in the treatment of TFCC tears. At this point, the patient will proceed with non-operative treatment.\par \par Pt will try wrist widget\par Start OT\par F/u in 6 weeks

## 2023-05-16 NOTE — IMAGING
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

## 2023-06-27 ENCOUNTER — APPOINTMENT (OUTPATIENT)
Dept: ORTHOPEDIC SURGERY | Facility: CLINIC | Age: 43
End: 2023-06-27

## 2023-08-08 NOTE — H&P PST ADULT - EXPECTED LOS
1 day Azelaic Acid Counseling: Patient counseled that medicine may cause skin irritation and to avoid applying near the eyes.  In the event of skin irritation, the patient was advised to reduce the amount of the drug applied or use it less frequently.   The patient verbalized understanding of the proper use and possible adverse effects of azelaic acid.  All of the patient's questions and concerns were addressed.

## 2023-09-14 NOTE — ED ADULT NURSE NOTE - NSFALLRSKINDICATORS_ED_ALL_ED
CERTIFICATE OF WORK       September 14, 2023      Re: Matilde Johnson  [address]  [facility] [address]      This is to certify that Matilde Johnson has been under my care from 9/14/2023 and will be off work until follow-up in 2 weeks.              SIGNATURE:___________________________________________          Tyrese Hagen DO  [facility] [address]  [address]  [facility] [address]  [facility] [address]  Dept Phone: [phone]   no

## 2023-10-09 NOTE — ASU DISCHARGE PLAN (ADULT/PEDIATRIC). - DIET
Caller: Obed Cotto Jr.    Relationship: Self    Best call back number: [phone]     Requested Prescriptions:   Requested Prescriptions     Pending Prescriptions Disp Refills    FeroSul 325 (65 Fe) MG tablet [Pharmacy Med Name: FERROUS SULFATE 325MG (5GR) TABS] 180 tablet 1     Sig: TAKE 1 TABLET BY MOUTH TWICE DAILY WITH MEALS        Pharmacy where request should be sent: [id number] [address]  AT [facility] [phone] [facility] [phone]      Last office visit with prescribing clinician: 8/21/2023   Last telemedicine visit with prescribing clinician: Visit date not found   Next office visit with prescribing clinician: 2/22/2024     Additional details provided by patient: OUT OF MEDICATION     Does the patient have less than a 3 day supply:  [x] Yes  [] No    Would you like a call back once the refill request has been completed: [] Yes [x] No    If the office needs to give you a call back, can they leave a voicemail: [] Yes [x] No    Stormy Dyson Rep   10/09/23 10:26 EDT       
no change

## 2023-11-28 NOTE — H&P PST ADULT - NSWEIGHTCALCTOOLDRUG_GEN_A_CORE
[employer] [facility] Program Note        Subjective Evaluation    History of Present Illness  Date of onset: 2023  Mechanism of injury: Landed on the top of her foot and rolled her ankle.     Pain  Current pain ratin  At best pain ratin  At worst pain ratin  Quality: sharp and pressure        Patient states that when she tries to walk on it that its painful after a couple of minutes. Patient states that sharp pain is over the bone and feels like when she fractured her ankle a couple years ago. She did not bruise then either which is why she is so concerned.       Objective     Observations   Left Ankle/Foot   Positive for edema. Negative for deformity.     Palpation   Left   No palpable tenderness to the lateral gastrocnemius and medial gastrocnemius.   Tenderness of the anterior tibialis.     Tenderness   Left Ankle/Foot   Tenderness in the anterior ankle, anterior talofibular ligament, fibula and lateral malleolus. No tenderness in the fifth metatarsal base, calcaneofibular ligament, deltoid ligament, first metatarsal head, medial malleolus and posterior talofibular ligament.     Tests   Left Ankle/Foot   Positive for percussion and syndesmosis squeeze.   Negative for metatarsal squeeze.            Assessment/Plan    Patient has a potential fibular fracture. Called patient's mom, Pam [phone], and informed her. Pam said that she would call to get her in for an x-ray as soon as she can. Patient is aware to continue to use crutches until seen. Coaches are also informed.     used

## 2024-11-21 NOTE — ASU DISCHARGE PLAN (ADULT/PEDIATRIC). - MEDICATION SUMMARY - MEDICATIONS TO CHANGE
Patient due for annual CT Lung Screening. Reminder letter mailed.    Jaz Goncalves RN      
I will SWITCH the dose or number of times a day I take the medications listed below when I get home from the hospital:  None